# Patient Record
Sex: FEMALE | Race: WHITE | URBAN - METROPOLITAN AREA
[De-identification: names, ages, dates, MRNs, and addresses within clinical notes are randomized per-mention and may not be internally consistent; named-entity substitution may affect disease eponyms.]

---

## 2017-02-26 ENCOUNTER — TELEPHONE (OUTPATIENT)
Dept: OBGYN | Facility: CLINIC | Age: 22
End: 2017-02-26

## 2017-02-26 NOTE — TELEPHONE ENCOUNTER
Patient would like to discuss removing Nexplanon and switching birth control. Would like to speak with someone and schedule with Anna Silver NP. Will be in town between March 3-12 if needing to remove Nexplanon. Please call to [phone]. Okay to leave detailed message.    Central Scheduling  [place]

## 2017-03-06 ENCOUNTER — OFFICE VISIT (OUTPATIENT)
Dept: OBGYN | Facility: CLINIC | Age: 22
End: 2017-03-06
Payer: COMMERCIAL

## 2017-03-06 VITALS — DIASTOLIC BLOOD PRESSURE: 76 MMHG | BODY MASS INDEX: 25.17 KG/M2 | SYSTOLIC BLOOD PRESSURE: 114 MMHG | WEIGHT: 168 LBS

## 2017-03-06 DIAGNOSIS — Z30.8 ENCOUNTER FOR OTHER CONTRACEPTIVE MANAGEMENT: Primary | ICD-10-CM

## 2017-03-06 DIAGNOSIS — Z30.46 NEXPLANON REMOVAL: Primary | ICD-10-CM

## 2017-03-06 PROCEDURE — 99212 OFFICE O/P EST SF 10 MIN: CPT | Mod: 25 | Performed by: NURSE PRACTITIONER

## 2017-03-06 PROCEDURE — 11982 REMOVE DRUG IMPLANT DEVICE: CPT | Performed by: PHYSICIAN ASSISTANT

## 2017-03-06 RX ORDER — DROSPIRENONE AND ETHINYL ESTRADIOL 0.02-3(28)
1 KIT ORAL DAILY
Qty: 84 TABLET | Refills: 0 | Status: SHIPPED | OUTPATIENT
Start: 2017-03-06 | End: 2017-05-26

## 2017-03-06 NOTE — PROGRESS NOTES
INDICATIONS:                                                      Rosa is here today for removal of Nexplanon contraceptive implant.     Is a pregnancy test required: No.  Was a consent obtained?  Yes    Today's PHQ-2 Score:   PHQ-2 ( 1999 Pfizer) 2/9/2016   Q1: Little interest or pleasure in doing things 0   Q2: Feeling down, depressed or hopeless 0   PHQ-2 Score 0       PROCEDURE:                                                      Patient was placed in dorsal supine position with left arm abducted and externally rotated. Nexplanon was palpated under skin. The area was cleansed with betadine. The distal site was injected with 2 ml of 1% lidocaine with epi. .  While pushing down on the proximal end, 2 mm incision was made over the distal implant with a scalpel. The implant was grasped with a mosquito forceps and removed intact. The skin was closed with Dermabond. A pressure bandage was placed for the next 12-24 hours.  She tolerated the procedure well. There were no complications. Patient was discharged in stable condition.    Call if bleeding, pain or fever occur. and Birth control counseling given.    Nissa Tellez PA-C

## 2017-03-06 NOTE — PROGRESS NOTES
SUBJECTIVE:                                                   Rosa Avalos is a 21 year old female who presents to clinic today for the following health issue(s):  Patient presents with:  Contraception: discuss removing       HPI:Patient had nexplanon inserted in 2016. First 2 months were good, no cycle at all.  Now the last 4 months spotting most of the time and 2 week long cycles.  Wants to have nexplanon removed and go back on birth control pills.      Patient's last menstrual period was 2017 (approximate)..   Patient is sexually active, .  Using Nexplanon for contraception.    reports that she has never smoked. She has never used smokeless tobacco.    STD testing offered?  Declined    Health maintenance updated:  yes    Today's PHQ-2 Score:   PHQ-2 (  Pfizer) 2016   Q1: Little interest or pleasure in doing things 0   Q2: Feeling down, depressed or hopeless 0   PHQ-2 Score 0     Today's PHQ-9 Score: No flowsheet data found.  Today's AYLEEN-7 Score: No flowsheet data found.    Problem list and histories reviewed & adjusted, as indicated.  Additional history: as documented.    Patient Active Problem List   Diagnosis     IBS (irritable bowel syndrome)     Menorrhagia     Past Surgical History   Procedure Laterality Date     [facility]        Social History   Substance Use Topics     Smoking status: Never Smoker     Smokeless tobacco: Never Used     Alcohol use 0.0 oz/week     0 Standard drinks or equivalent per week      Problem (# of Occurrences) Relation (Name,Age of Onset)    Arthritis (1) Maternal Grandmother    Breast Cancer (1) Paternal Grandmother    Dementia (1) Paternal Grandfather            Current Outpatient Prescriptions   Medication Sig     drospirenone-ethinyl estradiol (KASSANDRA) 3-0.02 MG per tablet Take 1 tablet by mouth daily     Fexofenadine HCl (ALLEGRA PO)      GLYCOPYRROLATE PO Take 2 mg by mouth daily     No current facility-administered medications for this  visit.      No Known Allergies    ROS:  12 point review of systems negative other than symptoms noted below.  Genitourinary: Cramps, Irregular Menses and Spotting    OBJECTIVE:     /76  Wt 168 lb (76.2 kg)  LMP 02/20/2017 (Approximate)  Breastfeeding? No  BMI 25.17 kg/m2  Body mass index is 25.17 kg/(m^2).    Exam:    Discussed methods, risks and benefits of birth control pills.  No contrainications to OC's.  Will do a 3 month trial of Kassandra birth control.  Nexplanon will be removed today by TIANA Duke and she will start pills today.  Discussed a BUM 1st 2 weeks. Patient to call with update.    In-Clinic Test Results:  No results found for this or any previous visit (from the past 24 hour(s)).    ASSESSMENT/PLAN:                                                        ICD-10-CM    1. Encounter for other contraceptive management Z30.8 drospirenone-ethinyl estradiol (KASSANDRA) 3-0.02 MG per tablet       There are no Patient Instructions on file for this visit.    Return prn.    STEFANY Ha [facility]

## 2017-03-06 NOTE — MR AVS SNAPSHOT
"              After Visit Summary   3/6/2017    Rosa Avalos    MRN: [id number]           Patient Information     Date Of Birth          1995        Visit Information        Provider Department      3/6/2017 8:30 AM Anna Silver APRN CNP [facility]        Today's Diagnoses     Encounter for other contraceptive management    -  1       Follow-ups after your visit        Follow-up notes from your care team     Return if symptoms worsen or fail to improve.      Who to contact     If you have questions or need follow up information about today's clinic visit or your schedule please contact [facility] directly at [phone].  Normal or non-critical lab and imaging results will be communicated to you by MyChart, letter or phone within 4 business days after the clinic has received the results. If you do not hear from us within 7 days, please contact the clinic through [employer] or phone. If you have a critical or abnormal lab result, we will notify you by phone as soon as possible.  Submit refill requests through PushCall or call your pharmacy and they will forward the refill request to us. Please allow 3 business days for your refill to be completed.          Additional Information About Your Visit        MyChart Information     PushCall lets you send messages to your doctor, view your test results, renew your prescriptions, schedule appointments and more. To sign up, go to [email or web address] . Click on \"Log in\" on the left side of the screen, which will take you to the Welcome page. Then click on \"Sign up Now\" on the right side of the page.     You will be asked to enter the access code listed below, as well as some personal information. Please follow the directions to create your username and password.     Your access code is: [email or web address]  Expires: 2017  9:02 AM     Your access code will  in 90 days. If you need help or a new code, please call your " [facility] or [phone].        Care EveryWhere ID     This is your Care EveryWhere ID. This could be used by other organizations to access your [facility] medical records  [id number]        Your Vitals Were     Last Period Breastfeeding? BMI (Body Mass Index)             02/20/2017 (Approximate) No 25.17 kg/m2          Blood Pressure from Last 3 Encounters:   03/06/17 114/76   08/15/16 134/80   02/09/16 128/70    Weight from Last 3 Encounters:   03/06/17 168 lb (76.2 kg)   08/15/16 163 lb (73.9 kg)   02/09/16 169 lb (76.7 kg)              Today, you had the following     No orders found for display         Today's Medication Changes          These changes are accurate as of: 3/6/17  9:17 AM.  If you have any questions, ask your nurse or doctor.               Start taking these medicines.        Dose/Directions    drospirenone-ethinyl estradiol 3-0.02 MG per tablet   Commonly known as:  KASSANDRA   Used for:  Encounter for other contraceptive management   Started by:  Anna Silver APRN CNP        Dose:  1 tablet   Take 1 tablet by mouth daily   Quantity:  84 tablet   Refills:  0            Where to get your medicines      These medications were sent to [id number] - [place], [place] - [address] AT [facility] [place] [address] [facility] [address]  [address], [place] [address]    Hours:  24-hours Phone:  [phone]     drospirenone-ethinyl estradiol 3-0.02 MG per tablet                Primary Care Provider    None Specified       No primary provider on file.        Thank you!     Thank you for choosing [facility] [place]  for your care. Our goal is always to provide you with excellent care. Hearing back from our patients is one way we can continue to improve our services. Please take a few minutes to complete the written survey that you may receive in the mail after your visit with us. Thank you!             Your Updated Medication List - Protect others around you: Learn  how to safely use, store and throw away your medicines at www.disposemymeds.org.          This list is accurate as of: 3/6/17  9:17 AM.  Always use your most recent med list.                   Brand Name Dispense Instructions for use    ALLEGRA PO          drospirenone-ethinyl estradiol 3-0.02 MG per tablet    KASSANDRA    84 tablet    Take 1 tablet by mouth daily       GLYCOPYRROLATE PO      Take 2 mg by mouth daily

## 2017-03-06 NOTE — MR AVS SNAPSHOT
"              After Visit Summary   3/6/2017    Rosa Avalos    MRN: [id number]           Patient Information     Date Of Birth          1995        Visit Information        Provider Department      3/6/2017 8:30 AM Nissa Tellez PA-C [facility]        Today's Diagnoses     Nexplanon removal    -  1       Follow-ups after your visit        Follow-up notes from your care team     Return in about 1 year (around 3/6/2018).      Who to contact     If you have questions or need follow up information about today's clinic visit or your schedule please contact [facility] directly at [phone].  Normal or non-critical lab and imaging results will be communicated to you by "[employer]hart, letter or phone within 4 business days after the clinic has received the results. If you do not hear from us within 7 days, please contact the clinic through MyChart or phone. If you have a critical or abnormal lab result, we will notify you by phone as soon as possible.  Submit refill requests through [employer] or call your pharmacy and they will forward the refill request to us. Please allow 3 business days for your refill to be completed.          Additional Information About Your Visit        MyChart Information     Floqq lets you send messages to your doctor, view your test results, renew your prescriptions, schedule appointments and more. To sign up, go to [email or web address] . Click on \"Log in\" on the left side of the screen, which will take you to the Welcome page. Then click on \"Sign up Now\" on the right side of the page.     You will be asked to enter the access code listed below, as well as some personal information. Please follow the directions to create your username and password.     Your access code is: [email or web address]  Expires: 2017  9:02 AM     Your access code will  in 90 days. If you need help or a new code, please call your [facility] clinic or " [phone].        Care EveryWhere ID     This is your Care EveryWhere ID. This could be used by other organizations to access your [facility] medical records  [id number]        Your Vitals Were     Last Period                   02/20/2017 (Approximate)            Blood Pressure from Last 3 Encounters:   03/06/17 114/76   08/15/16 134/80   02/09/16 128/70    Weight from Last 3 Encounters:   03/06/17 168 lb (76.2 kg)   08/15/16 163 lb (73.9 kg)   02/09/16 169 lb (76.7 kg)              We Performed the Following     REMOVAL NON-BIODEGRADABLE DRUG DELIVERY IMPLANT          Today's Medication Changes          These changes are accurate as of: 3/6/17  9:37 AM.  If you have any questions, ask your nurse or doctor.               Start taking these medicines.        Dose/Directions    drospirenone-ethinyl estradiol 3-0.02 MG per tablet   Commonly known as:  KASSANDRA   Used for:  Encounter for other contraceptive management   Started by:  Anna Silver APRN CNP        Dose:  1 tablet   Take 1 tablet by mouth daily   Quantity:  84 tablet   Refills:  0            Where to get your medicines      These medications were sent to [facility] - [place], [place] - [address] AT [facility] [place] [address]  [address], [place] [address]    Hours:  24-hours Phone:  [phone]     drospirenone-ethinyl estradiol 3-0.02 MG per tablet                Primary Care Provider    None Specified       No primary provider on file.        Thank you!     Thank you for choosing [facility] [place]  for your care. Our goal is always to provide you with excellent care. Hearing back from our patients is one way we can continue to improve our services. Please take a few minutes to complete the written survey that you may receive in the mail after your visit with us. Thank you!             Your Updated Medication List - Protect others around you: Learn how to safely use, store and throw away your  medicines at www.disposemymeds.org.          This list is accurate as of: 3/6/17  9:37 AM.  Always use your most recent med list.                   Brand Name Dispense Instructions for use    ALLEGRA PO          drospirenone-ethinyl estradiol 3-0.02 MG per tablet    KASSANDRA    84 tablet    Take 1 tablet by mouth daily       GLYCOPYRROLATE PO      Take 2 mg by mouth daily

## 2017-05-26 DIAGNOSIS — Z30.8 ENCOUNTER FOR OTHER CONTRACEPTIVE MANAGEMENT: ICD-10-CM

## 2017-05-26 RX ORDER — DROSPIRENONE AND ETHINYL ESTRADIOL 0.02-3(28)
KIT ORAL
Qty: 28 TABLET | Refills: 0 | Status: SHIPPED | OUTPATIENT
Start: 2017-05-26 | End: 2017-05-28

## 2017-05-26 NOTE — TELEPHONE ENCOUNTER
drospirenone-ethinyl estradiol (KASSANDRA) 3-0.02 MG per tablet   Last Written Prescription Date:  3/6/17  Last Fill Quantity: 84,   # refills: 0  Last Office Visit with [facility] primary care provider:  3/6/17  Future Office visit: none    Routing refill request to provider for review/approval because:  Refill one month,  pt needs to follow up with clinic

## 2017-09-05 DIAGNOSIS — Z30.8 ENCOUNTER FOR OTHER CONTRACEPTIVE MANAGEMENT: ICD-10-CM

## 2017-09-05 RX ORDER — DROSPIRENONE AND ETHINYL ESTRADIOL 0.02-3(28)
KIT ORAL
Qty: 84 TABLET | Refills: 1 | Status: SHIPPED | OUTPATIENT
Start: 2017-09-05 | End: 2017-12-21

## 2017-09-05 NOTE — TELEPHONE ENCOUNTER
VESTURA 3-0.02      Last Written Prescription Date:  5/30/17  Last Fill Quantity: 84,   # refills: 0  Last Office Visit with [facility] primary care provider:  3/6/17  Future Office visit: NONE    Routing refill request to provider for review/approval because:  Rx was not filled for enough to get her to next annual due time. Note rouetd to Brenda dugan for refill?

## 2017-12-21 ENCOUNTER — OFFICE VISIT (OUTPATIENT)
Dept: OBGYN | Facility: CLINIC | Age: 22
End: 2017-12-21
Payer: COMMERCIAL

## 2017-12-21 VITALS
WEIGHT: 160 LBS | BODY MASS INDEX: 23.7 KG/M2 | HEIGHT: 69 IN | DIASTOLIC BLOOD PRESSURE: 70 MMHG | SYSTOLIC BLOOD PRESSURE: 112 MMHG

## 2017-12-21 DIAGNOSIS — Z11.3 SCREEN FOR STD (SEXUALLY TRANSMITTED DISEASE): ICD-10-CM

## 2017-12-21 DIAGNOSIS — Z01.419 ENCOUNTER FOR GYNECOLOGICAL EXAMINATION WITHOUT ABNORMAL FINDING: Primary | ICD-10-CM

## 2017-12-21 DIAGNOSIS — Z30.8 ENCOUNTER FOR OTHER CONTRACEPTIVE MANAGEMENT: ICD-10-CM

## 2017-12-21 DIAGNOSIS — Z11.8 SCREENING FOR CHLAMYDIAL DISEASE: ICD-10-CM

## 2017-12-21 PROCEDURE — 99395 PREV VISIT EST AGE 18-39: CPT | Performed by: NURSE PRACTITIONER

## 2017-12-21 PROCEDURE — 87491 CHLMYD TRACH DNA AMP PROBE: CPT | Performed by: NURSE PRACTITIONER

## 2017-12-21 PROCEDURE — G0145 SCR C/V CYTO,THINLAYER,RESCR: HCPCS | Performed by: NURSE PRACTITIONER

## 2017-12-21 PROCEDURE — 87591 N.GONORRHOEAE DNA AMP PROB: CPT | Performed by: NURSE PRACTITIONER

## 2017-12-21 RX ORDER — HYOSCYAMINE SULFATE 0.125 MG
TABLET,DISINTEGRATING ORAL
Refills: 0 | COMMUNITY
Start: 2017-10-10

## 2017-12-21 RX ORDER — DROSPIRENONE AND ETHINYL ESTRADIOL 0.02-3(28)
1 KIT ORAL DAILY
Qty: 84 TABLET | Refills: 4 | Status: SHIPPED | OUTPATIENT
Start: 2017-12-21

## 2017-12-21 NOTE — MR AVS SNAPSHOT
"              After Visit Summary   12/21/2017    Rosa Avalos    MRN: [id number]           Patient Information     Date Of Birth          1995        Visit Information        Provider Department      12/21/2017 8:00 AM Anna Silver APRN CNP [facility]        Today's Diagnoses     Encounter for gynecological examination without abnormal finding    -  1    Screen for STD (sexually transmitted disease)        Screening for chlamydial disease        Encounter for other contraceptive management           Follow-ups after your visit        Follow-up notes from your care team     Return in about 1 year (around 12/21/2018) for Routine Visit.      Who to contact     If you have questions or need follow up information about today's clinic visit or your schedule please contact [facility] directly at [phone].  Normal or non-critical lab and imaging results will be communicated to you by MyChart, letter or phone within 4 business days after the clinic has received the results. If you do not hear from us within 7 days, please contact the clinic through MyChart or phone. If you have a critical or abnormal lab result, we will notify you by phone as soon as possible.  Submit refill requests through [employer] or call your pharmacy and they will forward the refill request to us. Please allow 3 business days for your refill to be completed.          Additional Information About Your Visit        [employer] Information     [employer] lets you send messages to your doctor, view your test results, renew your prescriptions, schedule appointments and more. To sign up, go to [email or web address] . Click on \"Log in\" on the left side of the screen, which will take you to the Welcome page. Then click on \"Sign up Now\" on the right side of the page.     You will be asked to enter the access code listed below, as well as some personal information. Please follow the directions to create " "your username and password.     Your access code is: [email or web address]  Expires: 3/21/2018  8:29 AM     Your access code will  in 90 days. If you need help or a new code, please call your [facility] or [phone].        Care EveryWhere ID     This is your Care EveryWhere ID. This could be used by other organizations to access your [facility] medical records  [id number]        Your Vitals Were     Height Last Period BMI (Body Mass Index)             5' 8.5\" (1.74 m) 2017 23.97 kg/m2          Blood Pressure from Last 3 Encounters:   17 112/70   17 114/76   08/15/16 134/80    Weight from Last 3 Encounters:   17 160 lb (72.6 kg)   17 168 lb (76.2 kg)   08/15/16 163 lb (73.9 kg)              We Performed the Following     CHLAMYDIA TRACHOMATIS PCR     NEISSERIA GONORRHOEA PCR     Pap imaged thin layer screen only - recommended age 21 - 24 years          Today's Medication Changes          These changes are accurate as of: 17  8:29 AM.  If you have any questions, ask your nurse or doctor.               These medicines have changed or have updated prescriptions.        Dose/Directions    drospirenone-ethinyl estradiol 3-0.02 MG per tablet   Commonly known as:  VESTURA   This may have changed:  See the new instructions.   Used for:  Encounter for other contraceptive management   Changed by:  Anna Silver APRN CNP        Dose:  1 tablet   Take 1 tablet by mouth daily   Quantity:  84 tablet   Refills:  4            Where to get your medicines      These medications were sent to [facility], [place] - [address] [place] [id number] [facility] [place] [address]     Phone:  [phone]     drospirenone-ethinyl estradiol 3-0.02 MG per tablet                Primary Care Provider Office Phone # Fax #    [facility] [phone]       [place] [id number] CHILDERSASHER ADKINS [facility] [address]  [facility] [address]        Equal Access to Services     RUBÉN JEFF : Hadii mateus Edouard, wayary gtz, marcbrandie beltranmalora almendarez, edi ramonkishorekwaku contreras . So [facility] [phone].    ATENCIÓN: Si habla español, tiene a steele disposición servicios gratuitos de asistencia lingüística. Llame al [phone].    We comply with applicable federal civil rights laws and [place] laws. We do not discriminate on the basis of race, color, national origin, age, disability, sex, sexual orientation, or gender identity.            Thank you!     Thank you for choosing [facility]  for your care. Our goal is always to provide you with excellent care. Hearing back from our patients is one way we can continue to improve our services. Please take a few minutes to complete the written survey that you may receive in the mail after your visit with us. Thank you!             Your Updated Medication List - Protect others around you: Learn how to safely use, store and throw away your medicines at www.disposemymeds.org.          This list is accurate as of: 12/21/17  8:29 AM.  Always use your most recent med list.                   Brand Name Dispense Instructions for use Diagnosis    ALLEGRA PO      Take by mouth daily        CALCIUM/VITAMIN D PO           drospirenone-ethinyl estradiol 3-0.02 MG per tablet    VESTURA    84 tablet    Take 1 tablet by mouth daily    Encounter for other contraceptive management       GLYCOPYRROLATE PO      Take 2 mg by mouth daily        hyoscyamine 0.125 MG Tbdp           MULTIVITAMIN PO

## 2017-12-21 NOTE — PROGRESS NOTES
Rosa is a 22 year old  female who presents for annual exam.     Besides routine health maintenance, she has no other health concerns today .    HPI: here for annual exam.  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  Thinks she wants to do Internal Medicine or Family Practice.  On OCP's and doing well.  No concerns today.    The patient's PCP is [facility].      GYNECOLOGIC HISTORY:    Patient's last menstrual period was 2017.  Her current contraception method is: oral contraceptives.  She  reports that she has never smoked. She has never used smokeless tobacco.  Patient is sexually active.  STD testing offered?  Accepted     Last PHQ-9 score on record = No flowsheet data found.  Last GAD7 score on record = No flowsheet data found.  Alcohol Score =     HEALTH MAINTENANCE:  Cholesterol: fasting today  Last Mammo: N/A, Result: not applicable, Next Mammo: due age 38  Pap:   Lab Results   Component Value Date    PAP NIL 07/15/2015   Colonoscopy:  N/A, Result: not applicable, Next Colonoscopy: due age 48  Dexa:  Never  Health maintenance updated:  yes    HISTORY:  Obstetric History       T0      L0     SAB0   TAB0   Ectopic0   Multiple0   Live Births0           Patient Active Problem List   Diagnosis     IBS (irritable bowel syndrome)     Menorrhagia     Past Surgical History:   Procedure Laterality Date     JORGE RODGERS        Social History   Substance Use Topics     Smoking status: Never Smoker     Smokeless tobacco: Never Used     Alcohol use 0.0 oz/week     0 Standard drinks or equivalent per week      Problem (# of Occurrences) Relation (Name,Age of Onset)    Arthritis (1) Maternal Grandmother    Breast Cancer (1) Paternal Grandmother    Dementia (1) Paternal Grandfather            Current Outpatient Prescriptions   Medication Sig     hyoscyamine 0.125 MG TBDP      Multiple Vitamins-Minerals (MULTIVITAMIN PO)      Calcium Carb-Cholecalciferol  "(CALCIUM/VITAMIN D PO)      drospirenone-ethinyl estradiol (VESTURA) 3-0.02 MG per tablet Take 1 tablet by mouth daily     Fexofenadine HCl (ALLEGRA PO) Take by mouth daily      GLYCOPYRROLATE PO Take 2 mg by mouth daily     [DISCONTINUED] VESTURA 3-0.02 MG per tablet TAKE 1 TABLET BY MOUTH DAILY     No current facility-administered medications for this visit.      No Known Allergies    Past medical, surgical, social and family histories were reviewed and updated in EPIC.    ROS:   12 point review of systems negative other than symptoms noted below.  Gastrointestinal: Diarrhea    EXAM:  /70  Ht 5' 8.5\" (1.74 m)  Wt 160 lb (72.6 kg)  LMP 12/04/2017  BMI 23.97 kg/m2   BMI: Body mass index is 23.97 kg/(m^2).    PHYSICAL EXAM:  Constitutional:  Appearance: Well nourished, well developed, alert, in no acute distress  Neck:  Lymph Nodes:  No lymphadenopathy present    Thyroid:  Gland size normal, nontender, no nodules or masses present  on palpation  Chest:  Respiratory Effort:  Breathing unlabored  Cardiovascular:    Heart: Auscultation:  Regular rate, normal rhythm, no murmurs present  Breasts: Inspection of Breasts:  No lymphadenopathy present., Palpation of Breasts and Axillae:  No masses present on palpation, no breast tenderness., Axillary Lymph Nodes:  No lymphadenopathy present. and No nodularity, asymmetry or nipple discharge bilaterally.  Gastrointestinal:   Abdominal Examination:  Abdomen nontender to palpation, tone normal without rigidity or guarding, no masses present, umbilicus without lesions   Liver and Spleen:  No hepatomegaly present, liver nontender to palpation    Hernias:  No hernias present  Lymphatic: Lymph Nodes:  No other lymphadenopathy present  Skin:  General Inspection:  No rashes present, no lesions present, no areas of  discoloration    Genitalia and Groin:  No rashes present, no lesions present, no areas of  discoloration, no masses present  Neurologic/Psychiatric:    Mental " Status:  Oriented X3     Pelvic Exam:  External Genitalia:     Normal appearance for age, no discharge present, no tenderness present, no inflammatory lesions present, color normal  Vagina:     Normal vaginal vault without central or paravaginal defects, no discharge present, no inflammatory lesions present, no masses present  Bladder:     Nontender to palpation  Urethra:   Urethral Body:  Urethra palpation normal, urethra structural support normal   Urethral Meatus:  No erythema or lesions present  Cervix:     Appearance healthy, no lesions present, nontender to palpation, no bleeding present  Uterus:     Uterus: firm, normal sized and nontender, anteverted in position.   Adnexa:     No adnexal tenderness present, no adnexal masses present  Perineum:     Perineum within normal limits, no evidence of trauma, no rashes or skin lesions present  Anus:     Anus within normal limits, no hemorrhoids present  Inguinal Lymph Nodes:     No lymphadenopathy present  Pubic Hair:     Normal pubic hair distribution for age  Genitalia and Groin:     No rashes present, no lesions present, no areas of discoloration, no masses present      COUNSELING:   Reviewed preventive health counseling, as reflected in patient instructions       Regular exercise       Healthy diet/nutrition       Contraception       Safe sex practices/STD prevention    BMI: Body mass index is 23.97 kg/(m^2).        ASSESSMENT:  22 year old female with satisfactory annual exam.    ICD-10-CM    1. Encounter for gynecological examination without abnormal finding Z01.419 Pap imaged thin layer screen only - recommended age 21 - 24 years   2. Screen for STD (sexually transmitted disease) Z11.3 NEISSERIA GONORRHOEA PCR   3. Screening for chlamydial disease Z11.8 CHLAMYDIA TRACHOMATIS PCR   4. Encounter for other contraceptive management Z30.8 drospirenone-ethinyl estradiol (VESTURA) 3-0.02 MG per tablet       PLAN:  Normal Gyn exam.  Ok to continue OC's for 1 year.     Return prn or 1 year for annual and pap smear.    Anna Silver, APRN CNP

## 2017-12-21 NOTE — LETTER
[facility]  [address] , [address]  [place]   [id number] [phone]        1/2/2018     Rosa Avalos  [address] [address]        Rosa Avalos your lab results came back normal.  Please call if any questions.      NEISSERIA GONORRHOEA PCR   Result Value Ref Range    Specimen Descrip Cervix     N Gonorrhea PCR Negative NEG^Negative   CHLAMYDIA TRACHOMATIS PCR   Result Value Ref Range    Specimen Description Cervix     Chlamydia Trachomatis PCR Negative NEG^Negative           Jamalially,    Anna Silver, APRN CNP

## 2017-12-21 NOTE — LETTER
January 6, 2018      Rosa Avalos  [address]  [place] [address]    Dear ,      I am happy to inform you that your recent cervical cancer screening test (PAP smear) was normal.      Preventative screenings such as this help to ensure your health for years to come. You should repeat a pap smear in 1 year, unless otherwise directed.      You will still need to return to the clinic every year for your annual exam and other preventive tests.     Please contact the clinic at [phone] if you have further questions.       Sincerely,      STEFANY Ha CNP/rlm

## 2017-12-22 LAB
C TRACH DNA SPEC QL NAA+PROBE: NEGATIVE
N GONORRHOEA DNA SPEC QL NAA+PROBE: NEGATIVE
SPECIMEN SOURCE: NORMAL
SPECIMEN SOURCE: NORMAL

## 2017-12-26 LAB
COPATH REPORT: NORMAL
PAP: NORMAL

## 2018-05-03 DIAGNOSIS — Z30.8 ENCOUNTER FOR OTHER CONTRACEPTIVE MANAGEMENT: ICD-10-CM

## 2018-05-03 RX ORDER — DROSPIRENONE AND ETHINYL ESTRADIOL 0.02-3(28)
KIT ORAL
Qty: 84 TABLET | Refills: 2 | Status: SHIPPED | OUTPATIENT
Start: 2018-05-03

## 2018-09-17 ENCOUNTER — TRANSFERRED RECORDS (OUTPATIENT)
Dept: HEALTH INFORMATION MANAGEMENT | Facility: CLINIC | Age: 23
End: 2018-09-17

## 2018-09-18 ENCOUNTER — TRANSFERRED RECORDS (OUTPATIENT)
Dept: HEALTH INFORMATION MANAGEMENT | Facility: CLINIC | Age: 23
End: 2018-09-18

## 2018-09-27 ENCOUNTER — TRANSFERRED RECORDS (OUTPATIENT)
Dept: HEALTH INFORMATION MANAGEMENT | Facility: CLINIC | Age: 23
End: 2018-09-27

## 2018-09-28 ENCOUNTER — TRANSFERRED RECORDS (OUTPATIENT)
Dept: HEALTH INFORMATION MANAGEMENT | Facility: CLINIC | Age: 23
End: 2018-09-28

## 2018-11-09 ENCOUNTER — TELEPHONE (OUTPATIENT)
Dept: OBGYN | Facility: CLINIC | Age: 23
End: 2018-11-09
Payer: COMMERCIAL

## 2018-11-09 NOTE — TELEPHONE ENCOUNTER
TCB    Patient is coming in to see Brenda on 11/13/18, for refill on her birth control. Please let patient know Brenda had sent in another script for the birth control on 5/3/18 to [facility] in [place], [place]. Please ask if patient is aware of this.     If patient states she is aware but patient is currently in [place], please tell her she can ask her closest [facility]'s pharmacy to transfer the remainder of the Rx.    If patient has concerns then she will need to be seen.     Please ask patient these upon call back.

## 2018-12-24 ENCOUNTER — TRANSFERRED RECORDS (OUTPATIENT)
Dept: HEALTH INFORMATION MANAGEMENT | Facility: CLINIC | Age: 23
End: 2018-12-24